# Patient Record
Sex: FEMALE | Race: WHITE | NOT HISPANIC OR LATINO | Employment: FULL TIME | URBAN - METROPOLITAN AREA
[De-identification: names, ages, dates, MRNs, and addresses within clinical notes are randomized per-mention and may not be internally consistent; named-entity substitution may affect disease eponyms.]

---

## 2021-07-07 ENCOUNTER — VBI (OUTPATIENT)
Dept: ADMINISTRATIVE | Facility: OTHER | Age: 41
End: 2021-07-07

## 2021-07-07 NOTE — TELEPHONE ENCOUNTER
Upon review of the In Basket request we were able to locate, review, and update the patient chart as requested for Immunization(s) Influenza, Mammogram and Pap Smear (HPV) aka Cervical Cancer Screening  Any additional questions or concerns should be emailed to the Practice Liaisons via [email or web address] email, please do not reply via In Basket      Thank you  Eddie Snell

## 2021-07-23 PROBLEM — Z80.3 FAMILY HISTORY OF MALIGNANT NEOPLASM OF BREAST: Status: ACTIVE | Noted: 2021-07-23

## 2021-07-23 PROBLEM — R92.8 ABNORMAL MAMMOGRAM: Status: ACTIVE | Noted: 2021-07-23

## 2021-07-27 ENCOUNTER — ANNUAL EXAM (OUTPATIENT)
Dept: OBGYN CLINIC | Facility: CLINIC | Age: 41
End: 2021-07-27
Payer: COMMERCIAL

## 2021-07-27 VITALS
DIASTOLIC BLOOD PRESSURE: 62 MMHG | HEIGHT: 65 IN | BODY MASS INDEX: 17.16 KG/M2 | WEIGHT: 103 LBS | SYSTOLIC BLOOD PRESSURE: 94 MMHG

## 2021-07-27 DIAGNOSIS — Z01.419 ENCOUNTER FOR GYNECOLOGICAL EXAMINATION (GENERAL) (ROUTINE) WITHOUT ABNORMAL FINDINGS: ICD-10-CM

## 2021-07-27 DIAGNOSIS — Z91.89 AT HIGH RISK FOR BREAST CANCER: Primary | ICD-10-CM

## 2021-07-27 DIAGNOSIS — R92.2 DENSE BREAST TISSUE ON MAMMOGRAM: ICD-10-CM

## 2021-07-27 DIAGNOSIS — Z12.4 SCREENING FOR MALIGNANT NEOPLASM OF THE CERVIX: ICD-10-CM

## 2021-07-27 PROCEDURE — 99396 PREV VISIT EST AGE 40-64: CPT | Performed by: OBSTETRICS & GYNECOLOGY

## 2021-07-27 NOTE — PROGRESS NOTES
Assessment/Plan:    Encounter for gynecological examination (general) (routine) without abnormal findings  Was seen in ER with chest discomfort radiating to arm, testing was normal and discharged with recommendation to f/u with PCP for possible stress test  Encouraged Neelam to contact Dr Charmaine Jean, agrees to plan  No gyn complaints, normal cycles  Normal breast and pelvic exams, pap done  Mammo and bilateral u/s orders given  Diagnoses and all orders for this visit:    At high risk for breast cancer  -     Mammo screening bilateral w 3d & cad; Future    Encounter for gynecological examination (general) (routine) without abnormal findings    Dense breast tissue on mammogram  -     US breast screening bilateral complete (ABUS); Future    Screening for malignant neoplasm of the cervix  -     Thinprep Tis Pap and HPV mRNA E6/E7 Reflex HPV 16,18/45    Other orders  -     Multiple Vitamins-Minerals (MULTI FOR HER 50+ PO); every 24 hours          Subjective:      Patient ID: Oanh Zaidi is a 42 y o  female  Here for well check  The following portions of the patient's history were reviewed and updated as appropriate: allergies, current medications, past family history, past medical history, past social history, past surgical history and problem list     Review of Systems  No breast, bladder, bowel changes   No new persistent pain, bloating, early satiety or pelvic pressure      Objective:      BP 94/62   Ht 5' 5" (1 651 m)   Wt 46 7 kg (103 lb)   LMP 06/30/2021 (Exact Date)   Breastfeeding No   BMI 17 14 kg/m²          Physical Exam  General appearance: no distress, pleasant  Neck: thyroid without nodules or thyromegaly, no palpable adenopathy  Lymph nodes: no palpable adenopathy  Breasts: no masses, nodes or skin changes  Abdomen: soft, non tender, no palpable masses  Pelvic exam: normal external genitalia, urethral meatus normal, vagina without lesions, cervix without lesions, uterus small, non tender, no adnexal masses, non tender  Rectal exam: no masses, non tender, RV confirms above

## 2021-07-27 NOTE — ASSESSMENT & PLAN NOTE
Was seen in ER with chest discomfort radiating to arm, testing was normal and discharged with recommendation to f/u with PCP for possible stress test  Encouraged Neelam to contact Dr Diamond Montiel, agrees to plan  No gyn complaints, normal cycles  Normal breast and pelvic exams, pap done  Mammo and bilateral u/s orders given

## 2021-07-27 NOTE — LETTER
July 27, 2021     Ryann Freedman, [address] [facility] [address] [facility]    Patient: Phill Cartagena   YOB: 1980   Date of Visit: 7/27/2021       Dear Dr Mazin Miller:    Thank you for referring Stacie Schneider to me for evaluation  Below are my notes for this consultation  If you have questions, please do not hesitate to call me  I look forward to following your patient along with you  Sincerely,        Levi Sanchez MD        CC: No Recipients  Levi Sanchez MD  7/27/2021  1:37 PM  Incomplete  Assessment/Plan:    Encounter for gynecological examination (general) (routine) without abnormal findings  Was seen in ER with chest discomfort radiating to arm, testing was normal and discharged with recommendation to f/u with PCP for possible stress test  Encouraged Neelam to contact Dr Mazin Miller, agrees to plan  No gyn complaints, normal cycles  Normal breast and pelvic exams, pap done  Mammo and bilateral u/s orders given  Diagnoses and all orders for this visit:    At high risk for breast cancer  -     Mammo screening bilateral w 3d & cad; Future    Encounter for gynecological examination (general) (routine) without abnormal findings    Dense breast tissue on mammogram  -     US breast screening bilateral complete (ABUS); Future    Screening for malignant neoplasm of the cervix  -     Thinprep Tis Pap and HPV mRNA E6/E7 Reflex HPV 16,18/45    Other orders  -     Multiple Vitamins-Minerals (MULTI FOR HER 50+ PO); every 24 hours          Subjective:      Patient ID: Phill Cartagena is a 42 y o  female  Here for well check  The following portions of the patient's history were reviewed and updated as appropriate: allergies, current medications, past family history, past medical history, past social history, past surgical history and problem list     Review of Systems  No breast, bladder, bowel changes   No new persistent pain, bloating, early satiety or pelvic pressure      Objective:      BP 94/62 Ht 5' 5" (1 651 m)   Wt 46 7 kg (103 lb)   LMP 06/30/2021 (Exact Date)   Breastfeeding No   BMI 17 14 kg/m²          Physical Exam  General appearance: no distress, pleasant  Neck: thyroid without nodules or thyromegaly, no palpable adenopathy  Lymph nodes: no palpable adenopathy  Breasts: no masses, nodes or skin changes  Abdomen: soft, non tender, no palpable masses  Pelvic exam: normal external genitalia, urethral meatus normal, vagina without lesions, cervix without lesions, uterus small, non tender, no adnexal masses, non tender  Rectal exam: no masses, non tender, RV confirms above      Susie Alba MD  7/27/2021  1:37 PM  Sign when Signing Visit  Assessment/Plan:    Encounter for gynecological examination (general) (routine) without abnormal findings  No breast, bladder, bowel changes  No new persistent pain, bloating, early satiety or pelvic pressure         Diagnoses and all orders for this visit:    At high risk for breast cancer  -     Mammo screening bilateral w 3d & cad; Future    Encounter for gynecological examination (general) (routine) without abnormal findings    Other orders  -     Multiple Vitamins-Minerals (MULTI FOR HER 50+ PO); every 24 hours          Subjective:      Patient ID: Mart Li is a 42 y o  female  Here for well check  The following portions of the patient's history were reviewed and updated as appropriate: allergies, current medications, past family history, past medical history, past social history, past surgical history and problem list     Review of Systems  No breast, bladder, bowel changes   No new persistent pain, bloating, early satiety or pelvic pressure      Objective:      BP 94/62   Ht 5' 5" (1 651 m)   Wt 46 7 kg (103 lb)   LMP 06/30/2021 (Exact Date)   Breastfeeding No   BMI 17 14 kg/m²          Physical Exam  General appearance: no distress, pleasant  Neck: thyroid without nodules or thyromegaly, no palpable adenopathy  Lymph nodes: no palpable adenopathy  Breasts: no masses, nodes or skin changes  Abdomen: soft, non tender, no palpable masses  Pelvic exam: normal external genitalia, urethral meatus normal, vagina without lesions, cervix without lesions, uterus small, non tender, no adnexal masses, non tender  Rectal exam: no masses, non tender, RV confirms above

## 2021-07-30 LAB
CLINICAL INFO: NORMAL
CYTO CVX: NORMAL
DATE PREVIOUS BX: NORMAL
HPV E6+E7 MRNA CVX QL NAA+PROBE: NOT DETECTED
LMP START DATE: NORMAL
SL AMB PREV. PAP:: NORMAL
SPECIMEN SOURCE CVX/VAG CYTO: NORMAL

## 2021-09-30 ENCOUNTER — HOSPITAL ENCOUNTER (OUTPATIENT)
Dept: RADIOLOGY | Facility: HOSPITAL | Age: 41
Discharge: HOME | End: 2021-09-30
Attending: RADIOLOGY

## 2021-09-30 DIAGNOSIS — Z76.89 REFERRAL OF PATIENT WITHOUT EXAMINATION OR TREATMENT: ICD-10-CM

## 2021-10-01 ENCOUNTER — HOSPITAL ENCOUNTER (OUTPATIENT)
Dept: ULTRASOUND IMAGING | Facility: CLINIC | Age: 41
Discharge: HOME | End: 2021-10-01
Payer: COMMERCIAL

## 2021-10-01 ENCOUNTER — TELEPHONE (OUTPATIENT)
Dept: OBGYN CLINIC | Facility: CLINIC | Age: 41
End: 2021-10-01

## 2021-10-01 VITALS — BODY MASS INDEX: 17.16 KG/M2 | WEIGHT: 103 LBS | HEIGHT: 65 IN

## 2021-10-01 DIAGNOSIS — R92.2 DENSE BREAST TISSUE ON MAMMOGRAM: ICD-10-CM

## 2021-10-01 PROCEDURE — 76641 ULTRASOUND BREAST COMPLETE: CPT

## 2021-12-17 ENCOUNTER — HOSPITAL ENCOUNTER (OUTPATIENT)
Dept: MAMMOGRAPHY | Facility: IMAGING CENTER | Age: 41
Discharge: HOME | End: 2021-12-17
Payer: COMMERCIAL

## 2021-12-17 VITALS — WEIGHT: 227.07 LBS | HEIGHT: 65 IN | BODY MASS INDEX: 37.83 KG/M2

## 2021-12-17 DIAGNOSIS — Z12.31 VISIT FOR SCREENING MAMMOGRAM: ICD-10-CM

## 2021-12-17 DIAGNOSIS — Z91.89 AT HIGH RISK FOR BREAST CANCER: ICD-10-CM

## 2021-12-17 PROCEDURE — 77067 SCR MAMMO BI INCL CAD: CPT

## 2021-12-17 PROCEDURE — 77063 BREAST TOMOSYNTHESIS BI: CPT

## 2022-11-04 NOTE — PROGRESS NOTES
Assessment/Plan:    Encounter for gynecological examination (general) (routine) without abnormal findings  All well, no complaints  Normal breast and pelvic exams  Last pap 7/2021 neg/HPV neg; due by 2026  Mammo and bilateral u/s orders given  Encouraged f/u with cardiology for concerns with palpitations  Diagnoses and all orders for this visit:    Encounter for gynecological examination (general) (routine) without abnormal findings    Encounter for screening mammogram for malignant neoplasm of breast  -     Mammo screening bilateral w 3d & cad; Future    Dense breast tissue on mammogram  -     US breast screening bilateral complete (ABUS); Future          Subjective:      Patient ID: Belén Caro is a 36 y o  female  HPI Here for well check  The following portions of the patient's history were reviewed and updated as appropriate:   She  has a past medical history of Papanicolaou smear (2017)  She   Patient Active Problem List    Diagnosis Date Noted   • Encounter for gynecological examination (general) (routine) without abnormal findings 07/27/2021   • Family history of malignant neoplasm of breast 07/23/2021   • BIRADS 3 07/23/2021     She  has a past surgical history that includes LAPAROSCOPY (2006); Ovarian cyst removal (Right, 2006); Tonsillectomy; and Mammo (historical) (2019)  Her family history includes Breast cancer in her maternal aunt and maternal grandmother; Lymphoma in her maternal grandmother and paternal grandfather; No Known Problems in her daughter, daughter, daughter, father, maternal aunt, maternal aunt, maternal aunt, maternal grandfather, paternal aunt, paternal grandmother, sister, sister, and sister; Other in her mother  She  reports that she has never smoked  She has never used smokeless tobacco  She reports current alcohol use of about 1 0 - 4 0 standard drink of alcohol per week  She reports that she does not use drugs    Current Outpatient Medications   Medication Sig Dispense Refill   • Multiple Vitamins-Minerals (MULTI FOR HER 50+ PO) every 24 hours       No current facility-administered medications for this visit  She is allergic to penicillin v     Review of Systems  No breast, bladder, bowel changes   No new persistent pain, bloating, early satiety or pelvic pressure  No menorrhagia, dysmenorrhea, intermenstrual bleeding      Objective:      /60 (BP Location: Left arm, Patient Position: Sitting, Cuff Size: Standard)   Ht 5' 5" (1 651 m)   Wt 45 4 kg (100 lb)   LMP 10/31/2022 (Exact Date)   BMI 16 64 kg/m²          Physical Exam    General appearance: no distress, pleasant  Neck: thyroid without nodules or thyromegaly, no palpable adenopathy  Lymph nodes: no palpable adenopathy  Breasts: no masses, nodes or skin changes  Abdomen: soft, non tender, no palpable masses  Pelvic exam: normal external genitalia, urethral meatus normal, vagina without lesions, cervix without lesions, uterus small, non tender, no adnexal masses, non tender  Rectal exam: no masses, non tender, RV confirms above

## 2022-11-07 ENCOUNTER — ANNUAL EXAM (OUTPATIENT)
Dept: OBGYN CLINIC | Facility: CLINIC | Age: 42
End: 2022-11-07

## 2022-11-07 VITALS
HEIGHT: 65 IN | SYSTOLIC BLOOD PRESSURE: 100 MMHG | WEIGHT: 100 LBS | BODY MASS INDEX: 16.66 KG/M2 | DIASTOLIC BLOOD PRESSURE: 60 MMHG

## 2022-11-07 DIAGNOSIS — R92.2 DENSE BREAST TISSUE ON MAMMOGRAM: ICD-10-CM

## 2022-11-07 DIAGNOSIS — Z12.31 ENCOUNTER FOR SCREENING MAMMOGRAM FOR MALIGNANT NEOPLASM OF BREAST: ICD-10-CM

## 2022-11-07 DIAGNOSIS — Z01.419 ENCOUNTER FOR GYNECOLOGICAL EXAMINATION (GENERAL) (ROUTINE) WITHOUT ABNORMAL FINDINGS: Primary | ICD-10-CM

## 2022-11-07 NOTE — ASSESSMENT & PLAN NOTE
All well, no complaints  Normal breast and pelvic exams  Last pap 7/2021 neg/HPV neg; due by 2026  Mammo and bilateral u/s orders given  Encouraged f/u with cardiology for concerns with palpitations

## 2022-11-07 NOTE — PATIENT INSTRUCTIONS
Return to office in one year unless having any problems such as breast changes, bleeding, new persistent pain, new progressive bloating, new problems eating (getting full to quickly) or new constant urinary pressure that does not resolve in one week  Call in six months to schedule your annual visit  Try Citrucel or Benefiber daily for the stool regularity  Colace if needed       Dr Fox Mitchell, cardiologist   [facility] [address] [place] [address], [address] [facility] [facility] [address]  Phone: [phone]

## 2022-11-07 NOTE — LETTER
November 7, 2022     Manasa Goncalves, [address] [facility] [address]  [address] [facility]    Patient: Nam Kline   YOB: 1980   Date of Visit: 11/7/2022       Dear Dr Javier Nicolas:    Thank you for referring Nany Canseco to me for evaluation  Below are my notes for this consultation  If you have questions, please do not hesitate to call me  I look forward to following your patient along with you  Sincerely,        Kayla Gates MD        CC: No Recipients  Kayla Gates MD  11/7/2022  3:43 PM  Sign when Signing Visit  Assessment/Plan:    Encounter for gynecological examination (general) (routine) without abnormal findings  All well, no complaints  Normal breast and pelvic exams  Last pap 7/2021 neg/HPV neg; due by 2026  Mammo and bilateral u/s orders given  Encouraged f/u with cardiology for concerns with palpitations  Diagnoses and all orders for this visit:    Encounter for gynecological examination (general) (routine) without abnormal findings    Encounter for screening mammogram for malignant neoplasm of breast  -     Mammo screening bilateral w 3d & cad; Future    Dense breast tissue on mammogram  -     US breast screening bilateral complete (ABUS); Future          Subjective:      Patient ID: Nam Kline is a 36 y o  female  HPI Here for well check  The following portions of the patient's history were reviewed and updated as appropriate:   She  has a past medical history of Papanicolaou smear (2017)  She   Patient Active Problem List    Diagnosis Date Noted   • Encounter for gynecological examination (general) (routine) without abnormal findings 07/27/2021   • Family history of malignant neoplasm of breast 07/23/2021   • BIRADS 3 07/23/2021     She  has a past surgical history that includes LAPAROSCOPY (2006); Ovarian cyst removal (Right, 2006); Tonsillectomy; and Mammo (historical) (2019)    Her family history includes Breast cancer in her maternal aunt and maternal grandmother; Lymphoma in her maternal grandmother and paternal grandfather; No Known Problems in her daughter, daughter, daughter, father, maternal aunt, maternal aunt, maternal aunt, maternal grandfather, paternal aunt, paternal grandmother, sister, sister, and sister; Other in her mother  She  reports that she has never smoked  She has never used smokeless tobacco  She reports current alcohol use of about 1 0 - 4 0 standard drink of alcohol per week  She reports that she does not use drugs  Current Outpatient Medications   Medication Sig Dispense Refill   • Multiple Vitamins-Minerals (MULTI FOR HER 50+ PO) every 24 hours       No current facility-administered medications for this visit  She is allergic to penicillin v     Review of Systems  No breast, bladder, bowel changes   No new persistent pain, bloating, early satiety or pelvic pressure  No menorrhagia, dysmenorrhea, intermenstrual bleeding      Objective:      /60 (BP Location: Left arm, Patient Position: Sitting, Cuff Size: Standard)   Ht 5' 5" (1 651 m)   Wt 45 4 kg (100 lb)   LMP 10/31/2022 (Exact Date)   BMI 16 64 kg/m²          Physical Exam    General appearance: no distress, pleasant  Neck: thyroid without nodules or thyromegaly, no palpable adenopathy  Lymph nodes: no palpable adenopathy  Breasts: no masses, nodes or skin changes  Abdomen: soft, non tender, no palpable masses  Pelvic exam: normal external genitalia, urethral meatus normal, vagina without lesions, cervix without lesions, uterus small, non tender, no adnexal masses, non tender  Rectal exam: no masses, non tender, RV confirms above

## 2023-10-30 ENCOUNTER — TELEPHONE (OUTPATIENT)
Dept: OBGYN CLINIC | Facility: CLINIC | Age: 43
End: 2023-10-30

## 2023-10-30 DIAGNOSIS — Z12.31 ENCOUNTER FOR SCREENING MAMMOGRAM FOR MALIGNANT NEOPLASM OF BREAST: ICD-10-CM

## 2023-10-30 DIAGNOSIS — R92.343 EXTREMELY DENSE TISSUE OF BOTH BREASTS ON MAMMOGRAPHY: Primary | ICD-10-CM

## 2023-10-30 NOTE — TELEPHONE ENCOUNTER
Luh Jackson is requesting new Mammogram & Breast U/S orders. Her 2022 orders will  prior to testing appt. . Informed Luh Jackson will place orders in her chart & scheduling dept can receive them from there.

## 2024-02-21 PROBLEM — Z01.419 ENCOUNTER FOR GYNECOLOGICAL EXAMINATION (GENERAL) (ROUTINE) WITHOUT ABNORMAL FINDINGS: Status: RESOLVED | Noted: 2021-07-27 | Resolved: 2024-02-21

## 2024-05-14 ENCOUNTER — HOSPITAL ENCOUNTER (OUTPATIENT)
Dept: MAMMOGRAPHY | Facility: CLINIC | Age: 44
Discharge: HOME | End: 2024-05-14
Payer: COMMERCIAL

## 2024-05-14 ENCOUNTER — HOSPITAL ENCOUNTER (OUTPATIENT)
Dept: ULTRASOUND IMAGING | Facility: CLINIC | Age: 44
Discharge: HOME | End: 2024-05-14
Payer: COMMERCIAL

## 2024-05-14 VITALS — WEIGHT: 100 LBS | BODY MASS INDEX: 16.66 KG/M2 | HEIGHT: 65 IN

## 2024-05-14 DIAGNOSIS — R92.343 EXTREMELY DENSE TISSUE OF BOTH BREASTS ON MAMMOGRAPHY: ICD-10-CM

## 2024-05-14 DIAGNOSIS — Z12.31 ENCOUNTER FOR SCREENING MAMMOGRAM FOR MALIGNANT NEOPLASM OF BREAST: ICD-10-CM

## 2024-05-14 PROCEDURE — 77063 BREAST TOMOSYNTHESIS BI: CPT

## 2024-05-14 PROCEDURE — 77067 SCR MAMMO BI INCL CAD: CPT

## 2024-05-14 PROCEDURE — 76641 ULTRASOUND BREAST COMPLETE: CPT

## 2024-10-08 NOTE — PROGRESS NOTES
Assessment/Plan:    Encounter for gynecological examination (general) (routine) without abnormal findings  Here for well check, no breast or gyn complaints.   Monthly cycles.  Some issues with hard stool and occasional bleeding.    Normal breast and pelvic exams.  Last pap 7/2021 neg/HPV neg; due by 2026  Mammo order given, last BIRADS 1D 5/14/24 with ABUS  Colonoscopy recs reviewed. Due to bleeding, recommend colonoscopy. Referral placed.     Diagnoses and all orders for this visit:    Encounter for gynecological examination (general) (routine) without abnormal findings    Encounter for screening mammogram for malignant neoplasm of breast  -     Mammo screening bilateral w 3d and cad; Future    Extremely dense tissue of both breasts on mammography  -     US breast screening bilateral complete (ABUS); Future    Colon cancer screening  -     Cancel: Cologuard  -     Ambulatory Referral to Gastroenterology; Future        Subjective:      Patient ID: Neelam Rg is a 44 y.o. female.    HPI Here for well check.    The following portions of the patient's history were reviewed and updated as appropriate: She  has a past medical history of Papanicolaou smear (2017).  She   Patient Active Problem List    Diagnosis Date Noted    Family history of malignant neoplasm of breast 07/23/2021    BIRADS 3 07/23/2021     She  has a past surgical history that includes LAPAROSCOPY (2006); Ovarian cyst removal (Right, 2006); Tonsillectomy; and Mammo (historical) (2019).  Her family history includes Asthma in her daughter and sister; Breast cancer in her maternal aunt and maternal grandmother; Lymphoma in her maternal grandmother and paternal grandfather; No Known Problems in her daughter, daughter, father, maternal aunt, maternal aunt, maternal aunt, maternal grandfather, paternal aunt, paternal grandmother, sister, and sister; Other in her mother.  She  reports that she has never smoked. She has never been exposed to tobacco  "smoke. She has never used smokeless tobacco. She reports current alcohol use of about 4.0 - 5.0 standard drinks of alcohol per week. She reports that she does not use drugs.  Current Outpatient Medications   Medication Sig Dispense Refill    Multiple Vitamins-Minerals (MULTI FOR HER 50+ PO) every 24 hours       No current facility-administered medications for this visit.     She is allergic to penicillin v..    Review of Systems  No breast, bladder changes. No new persistent pain, bloating, early satiety or pelvic pressure  Hard stool with occasional bleeding  No menorrhagia, dysmenorrhea, intermenstrual bleeding    Objective:    /72 (BP Location: Left arm, Patient Position: Sitting, Cuff Size: Standard)   Ht 5' 5\" (1.651 m)   Wt 45.7 kg (100 lb 12.8 oz)   LMP 10/01/2024 (Exact Date)   BMI 16.77 kg/m²      Physical Exam    General appearance: no distress, pleasant  Neck: thyroid without nodules or thyromegaly, no palpable adenopathy  Lymph nodes: no palpable adenopathy  Breasts: no masses, nodes or skin changes  Abdomen: soft, non tender, no palpable masses  Pelvic exam: normal external genitalia, urethral meatus normal, vagina without lesions, parous cervix without lesions, uterus small, non tender, no adnexal masses, non tender  Rectal exam: deferred per request; hard stool appreciated through posterior vaginal wall    "

## 2024-10-11 ENCOUNTER — ANNUAL EXAM (OUTPATIENT)
Dept: OBGYN CLINIC | Facility: CLINIC | Age: 44
End: 2024-10-11
Payer: COMMERCIAL

## 2024-10-11 VITALS
HEIGHT: 65 IN | SYSTOLIC BLOOD PRESSURE: 110 MMHG | BODY MASS INDEX: 16.79 KG/M2 | WEIGHT: 100.8 LBS | DIASTOLIC BLOOD PRESSURE: 72 MMHG

## 2024-10-11 DIAGNOSIS — R92.343 EXTREMELY DENSE TISSUE OF BOTH BREASTS ON MAMMOGRAPHY: ICD-10-CM

## 2024-10-11 DIAGNOSIS — Z12.31 ENCOUNTER FOR SCREENING MAMMOGRAM FOR MALIGNANT NEOPLASM OF BREAST: ICD-10-CM

## 2024-10-11 DIAGNOSIS — Z12.11 COLON CANCER SCREENING: ICD-10-CM

## 2024-10-11 DIAGNOSIS — Z01.419 ENCOUNTER FOR GYNECOLOGICAL EXAMINATION (GENERAL) (ROUTINE) WITHOUT ABNORMAL FINDINGS: Primary | ICD-10-CM

## 2024-10-11 PROCEDURE — 99396 PREV VISIT EST AGE 40-64: CPT | Performed by: OBSTETRICS & GYNECOLOGY

## 2024-10-11 NOTE — ASSESSMENT & PLAN NOTE
Here for well check, no breast or gyn complaints.   Monthly cycles.  Some issues with hard stool and occasional bleeding.    Normal breast and pelvic exams.  Last pap 7/2021 neg/HPV neg; due by 2026  Mammo order given, last BIRADS 1D 5/14/24 with ABUS  Colonoscopy recs reviewed. Due to bleeding, recommend colonoscopy. Referral placed.

## 2024-10-11 NOTE — PATIENT INSTRUCTIONS
Marylou Manning, nutritionist in [place].  [facility] Covered By Insurance  [place]  Marylou Manning, [employer] ([email or web address])      Colace 1-2 times daily, benefiber 1-2 times daily    Return to office in one year unless having any problems such as breast changes, bleeding, new persistent pain, new progressive bloating, new problems eating (getting full to quickly) or new constant urinary pressure that does not resolve in one week.    [facility]: [phone] for your colonoscopy. Dr German is great.

## 2024-10-11 NOTE — LETTER
October 11, 2024     Suzette Harris MD  [address]  [facility] [address]    Patient: Neelam Rg   YOB: 1980   Date of Visit: 10/11/2024       Dear Dr. Harris:    Neelam Rg was in today for her annual gyn exam. Below are my notes for this visit.    If you have questions, please do not hesitate to call me. I look forward to following your patient along with you.         Sincerely,        Lyndsay Casas MD        CC: No Recipients    Lyndsay Casas MD  10/11/2024  3:03 PM  Sign when Signing Visit  Assessment/Plan:    Encounter for gynecological examination (general) (routine) without abnormal findings  Here for well check, no breast or gyn complaints.   Monthly cycles.  Some issues with hard stool and occasional bleeding.    Normal breast and pelvic exams.  Last pap 7/2021 neg/HPV neg; due by 2026  Mammo order given, last BIRADS 1D 5/14/24 with ABUS  Colonoscopy recs reviewed. Due to bleeding, recommend colonoscopy. Referral placed.     Diagnoses and all orders for this visit:    Encounter for gynecological examination (general) (routine) without abnormal findings    Encounter for screening mammogram for malignant neoplasm of breast  -     Mammo screening bilateral w 3d and cad; Future    Extremely dense tissue of both breasts on mammography  -     US breast screening bilateral complete (ABUS); Future    Colon cancer screening  -     Cancel: Cologuard  -     Ambulatory Referral to Gastroenterology; Future        Subjective:      Patient ID: Neelam Rg is a 44 y.o. female.    HPI Here for well check.    The following portions of the patient's history were reviewed and updated as appropriate: She  has a past medical history of Papanicolaou smear (2017).  She   Patient Active Problem List    Diagnosis Date Noted   • Family history of malignant neoplasm of breast 07/23/2021   • BIRADS 3 07/23/2021     She  has a past surgical history that includes LAPAROSCOPY (2006); Ovarian cyst removal (Right,  "2006); Tonsillectomy; and Mammo (historical) (2019).  Her family history includes Asthma in her daughter and sister; Breast cancer in her maternal aunt and maternal grandmother; Lymphoma in her maternal grandmother and paternal grandfather; No Known Problems in her daughter, daughter, father, maternal aunt, maternal aunt, maternal aunt, maternal grandfather, paternal aunt, paternal grandmother, sister, and sister; Other in her mother.  She  reports that she has never smoked. She has never been exposed to tobacco smoke. She has never used smokeless tobacco. She reports current alcohol use of about 4.0 - 5.0 standard drinks of alcohol per week. She reports that she does not use drugs.  Current Outpatient Medications   Medication Sig Dispense Refill   • Multiple Vitamins-Minerals (MULTI FOR HER 50+ PO) every 24 hours       No current facility-administered medications for this visit.     She is allergic to penicillin v..    Review of Systems  No breast, bladder changes. No new persistent pain, bloating, early satiety or pelvic pressure  Hard stool with occasional bleeding  No menorrhagia, dysmenorrhea, intermenstrual bleeding    Objective:    /72 (BP Location: Left arm, Patient Position: Sitting, Cuff Size: Standard)   Ht 5' 5\" (1.651 m)   Wt 45.7 kg (100 lb 12.8 oz)   LMP 10/01/2024 (Exact Date)   BMI 16.77 kg/m²      Physical Exam    General appearance: no distress, pleasant  Neck: thyroid without nodules or thyromegaly, no palpable adenopathy  Lymph nodes: no palpable adenopathy  Breasts: no masses, nodes or skin changes  Abdomen: soft, non tender, no palpable masses  Pelvic exam: normal external genitalia, urethral meatus normal, vagina without lesions, parous cervix without lesions, uterus small, non tender, no adnexal masses, non tender  Rectal exam: deferred per request; hard stool appreciated through posterior vaginal wall    "

## 2025-05-20 ENCOUNTER — HOSPITAL ENCOUNTER (OUTPATIENT)
Dept: RADIOLOGY | Age: 45
Discharge: HOME | End: 2025-05-20
Payer: COMMERCIAL

## 2025-05-20 VITALS — WEIGHT: 100 LBS | BODY MASS INDEX: 16.66 KG/M2 | HEIGHT: 65 IN

## 2025-05-20 DIAGNOSIS — Z12.31 ENCOUNTER FOR SCREENING MAMMOGRAM FOR MALIGNANT NEOPLASM OF BREAST: ICD-10-CM

## 2025-05-20 DIAGNOSIS — R92.343 EXTREMELY DENSE TISSUE OF BOTH BREASTS ON MAMMOGRAPHY: ICD-10-CM

## 2025-05-20 PROCEDURE — 77063 BREAST TOMOSYNTHESIS BI: CPT

## 2025-05-20 PROCEDURE — 77067 SCR MAMMO BI INCL CAD: CPT

## 2025-05-20 PROCEDURE — 76641 ULTRASOUND BREAST COMPLETE: CPT

## 2025-05-22 ENCOUNTER — RESULTS FOLLOW-UP (OUTPATIENT)
Dept: OBGYN CLINIC | Facility: CLINIC | Age: 45
End: 2025-05-22

## 2025-07-28 DIAGNOSIS — D17.9 BENIGN LIPOMATOUS NEOPLASM, UNSPECIFIED: ICD-10-CM

## 2025-08-01 ENCOUNTER — HOSPITAL ENCOUNTER (OUTPATIENT)
Age: 45
Discharge: HOME | End: 2025-08-01
Attending: FAMILY MEDICINE
Payer: COMMERCIAL

## 2025-08-01 DIAGNOSIS — D17.9 BENIGN LIPOMATOUS NEOPLASM, UNSPECIFIED: ICD-10-CM

## 2025-08-01 PROCEDURE — 71250 CT THORAX DX C-: CPT

## 2025-08-07 ENCOUNTER — TELEPHONE (OUTPATIENT)
Age: 45
End: 2025-08-07

## 2025-08-07 ENCOUNTER — OFFICE VISIT (OUTPATIENT)
Age: 45
End: 2025-08-07
Payer: COMMERCIAL

## 2025-08-07 VITALS
HEIGHT: 65 IN | WEIGHT: 106.2 LBS | HEART RATE: 79 BPM | SYSTOLIC BLOOD PRESSURE: 112 MMHG | BODY MASS INDEX: 17.69 KG/M2 | OXYGEN SATURATION: 99 % | DIASTOLIC BLOOD PRESSURE: 79 MMHG | TEMPERATURE: 98.3 F | RESPIRATION RATE: 15 BRPM

## 2025-08-07 DIAGNOSIS — D17.9 LIPOMA: Primary | ICD-10-CM

## 2025-08-07 PROCEDURE — 99204 OFFICE O/P NEW MOD 45 MIN: CPT | Performed by: SURGERY
